# Patient Record
Sex: MALE | Race: BLACK OR AFRICAN AMERICAN
[De-identification: names, ages, dates, MRNs, and addresses within clinical notes are randomized per-mention and may not be internally consistent; named-entity substitution may affect disease eponyms.]

---

## 2019-12-16 ENCOUNTER — HOSPITAL ENCOUNTER (EMERGENCY)
Dept: HOSPITAL 25 - ED | Age: 19
Discharge: HOME | End: 2019-12-16
Payer: COMMERCIAL

## 2019-12-16 VITALS — DIASTOLIC BLOOD PRESSURE: 75 MMHG | SYSTOLIC BLOOD PRESSURE: 124 MMHG

## 2019-12-16 DIAGNOSIS — R00.2: Primary | ICD-10-CM

## 2019-12-16 DIAGNOSIS — R53.83: ICD-10-CM

## 2019-12-16 DIAGNOSIS — R25.1: ICD-10-CM

## 2019-12-16 LAB
ALBUMIN SERPL BCG-MCNC: 4.8 G/DL (ref 3.2–5.2)
ALBUMIN/GLOB SERPL: 1.9 {RATIO} (ref 1–3)
ALP SERPL-CCNC: 61 U/L (ref 34–104)
ALT SERPL W P-5'-P-CCNC: 12 U/L (ref 7–52)
ANION GAP SERPL CALC-SCNC: 10 MMOL/L (ref 2–11)
AST SERPL-CCNC: 15 U/L (ref 13–39)
BASOPHILS # BLD AUTO: 0 10^3/UL (ref 0–0.2)
BUN SERPL-MCNC: 22 MG/DL (ref 6–24)
BUN/CREAT SERPL: 17.9 (ref 8–20)
CALCIUM SERPL-MCNC: 9.7 MG/DL (ref 8.6–10.3)
CHLORIDE SERPL-SCNC: 101 MMOL/L (ref 101–111)
EOSINOPHIL # BLD AUTO: 0 10^3/UL (ref 0–0.6)
GLOBULIN SER CALC-MCNC: 2.5 G/DL (ref 2–4)
GLUCOSE SERPL-MCNC: 96 MG/DL (ref 70–100)
HCO3 SERPL-SCNC: 24 MMOL/L (ref 22–32)
HCT VFR BLD AUTO: 46 % (ref 42–52)
HGB BLD-MCNC: 15.8 G/DL (ref 14–18)
LYMPHOCYTES # BLD AUTO: 1.6 10^3/UL (ref 1–4.8)
MAGNESIUM SERPL-MCNC: 1.9 MG/DL (ref 1.9–2.7)
MCH RBC QN AUTO: 30 PG (ref 27–31)
MCHC RBC AUTO-ENTMCNC: 34 G/DL (ref 31–36)
MCV RBC AUTO: 88 FL (ref 80–94)
MONOCYTES # BLD AUTO: 0.5 10^3/UL (ref 0–0.8)
NEUTROPHILS # BLD AUTO: 6.2 10^3/UL (ref 1.5–7.7)
NRBC # BLD AUTO: 0 10^3/UL
NRBC BLD QL AUTO: 0.1
PLATELET # BLD AUTO: 195 10^3/UL (ref 150–450)
POTASSIUM SERPL-SCNC: 3.6 MMOL/L (ref 3.5–5)
PROT SERPL-MCNC: 7.3 G/DL (ref 6.4–8.9)
RBC # BLD AUTO: 5.22 10^6 /UL (ref 4.18–5.48)
SODIUM SERPL-SCNC: 135 MMOL/L (ref 135–145)
TROPONIN I SERPL-MCNC: 0 NG/ML (ref ?–0.03)
TSH SERPL-ACNC: 1.07 MCIU/ML (ref 0.34–5.6)
WBC # BLD AUTO: 8.3 10^3/UL (ref 3.5–10.8)

## 2019-12-16 PROCEDURE — 99282 EMERGENCY DEPT VISIT SF MDM: CPT

## 2019-12-16 PROCEDURE — 85025 COMPLETE CBC W/AUTO DIFF WBC: CPT

## 2019-12-16 PROCEDURE — 84484 ASSAY OF TROPONIN QUANT: CPT

## 2019-12-16 PROCEDURE — 83735 ASSAY OF MAGNESIUM: CPT

## 2019-12-16 PROCEDURE — 80053 COMPREHEN METABOLIC PANEL: CPT

## 2019-12-16 PROCEDURE — 36415 COLL VENOUS BLD VENIPUNCTURE: CPT

## 2019-12-16 PROCEDURE — 93005 ELECTROCARDIOGRAM TRACING: CPT

## 2019-12-16 PROCEDURE — 84443 ASSAY THYROID STIM HORMONE: CPT

## 2019-12-16 NOTE — ED
Palpitations / Dysrhythmia





- HPI Summary


HPI Summary: 


19 year old M presenting to [facility] complains of intermittent episodes of 

tachycardia and tremors lasting for about 10 minutes and happening every 10 

minutes while trying to fall asleep minutes prior to arrival and intermittent 

palpitations for the last several days. Felt fine before going to bed hours 

ago. Has had mild intermittent weakness in his extremities for the last several 

days. Reports feeling bloated immediately after eating only a little. No light 

headedness, syncope, chest pain. Was seen at [facility] yesterday where he 

had normal EKG done. No recent diet change or stress. The patient rates the 

pain 0/10 in severity. Symptoms aggravated by nothing. Symptoms alleviated by 

nothing.





- History of Current Complaint


Hx Obtained From: Patient


Onset/Duration: Lasting Days, Still Present


Timing: Intermittent Episodes Lasting: - minutes


Severity Currently: None


Aggravating: Nothing


Alleviating: Nothing





- Allergy/Home Medications


Allergies/Adverse Reactions: 


 Allergies











Allergy/AdvReac Type Severity Reaction Status Date / Time


 


No Known Allergies Allergy   Verified 12/16/19 03:40











Home Medications: 


 Home Medications





NK [No Home Medications Reported]  12/16/19 [History Confirmed 12/16/19]











PMH/Surg Hx/FS Hx/Imm Hx


Endocrine/Hematology History: 


   Denies: Hx Diabetes


Cardiovascular History: 


   Denies: Hx Hypertension


Sensory History: Reports: Hx Contacts or Glasses


Opthamlomology History: Reports: Hx Contacts or Glasses





- Surgical History


Surgical History: None


Infectious Disease History: No


Infectious Disease History: 


   Denies: Traveled Outside the [place] in Last 30 Days





- Family History


Known Family History: Positive: Diabetes - maternal side





- Social History


Alcohol Use: None


Hx Substance Use: No


Substance Use Type: Reports: None


Hx Tobacco Use: No


Smoking Status (MU): Never Smoked Tobacco





Review of Systems


Positive: Other - tremors


Negative: Chest Pain


Positive: Other - tachycardia, palpitations


Positive: Other -  feeling bloated immediately after eating only a little


Neurological: Negative - light headedness


Positive: Weakness.  Negative: Syncope


All Other Systems Reviewed And Are Negative: Yes





Physical Exam





- Summary


Physical Exam Summary: 





Appearance: Well-appearing, Well-nourished, lying in bed comfortably


Skin: Warm, dry, no obvious rash


Eyes: sclera anicteric, no conjunctival pallor


ENT: mucous membranes moist, pharynx appears normal


Neck: Supple, nontender


Respiratory: Clear to auscultation, no signs of respiratory distress


Cardiovascular: Normal S1, S2. No murmurs. Normal distal pulses in tibial and 

radial bilaterally.


Abdomen: Soft, nontender, normal active bowel sounds present


Musculoskeletal: Normal, Strength/ROM Intact


Neurological: A&Ox3, awake and alert, mentation is normal, speech is fluent and 

appropriate


Psychiatric: affect is normal, does not appear anxious or depressed


Triage Information Reviewed: Yes


Vital Signs On Initial Exam: 


 Initial Vitals











Temp Pulse Resp BP Pulse Ox


 


 99.0 F   80   16   147/82   100 


 


 12/16/19 03:36  12/16/19 03:36  12/16/19 03:36  12/16/19 03:36  12/16/19 03:36











Vital Signs Reviewed: Yes





Procedures





- Sedation


Patient Received Moderate/Deep Sedation with Procedure: No





Diagnostics





- Vital Signs


 Vital Signs











  Temp Pulse Resp BP Pulse Ox


 


 12/16/19 03:36  99.0 F  80  16  147/82  100














- Laboratory


Result Diagrams: 


 12/16/19 04:07





 12/16/19 04:07


Lab Statement: Any lab studies that have been ordered have been reviewed, and 

results considered in the medical decision making process.





- EKG


  ** [id number]


Cardiac Rate: NL - 65 BPM


EKG Rhythm: Sinus Rhythm


Summary of EKG Findings: NSR at 65 BPM, P waves, QRS complex, and T waves are 

within normal limits, T waves and intervals are normal, no ischemic changes. 

This is a normal EKG.





Re-Evaluation





- Re-Evaluation


  ** First Eval


Re-Evaluation Time: 05:14


Comment: informed of lab findings.  agrees to d/c





Course/Dx





- Course


Course Of Treatment: 19 year old M arriving via private car complains of 

intermittent episodes of tachycardia and tremors lasting for about 10 minutes 

and happening every 10 minutes while trying to fall asleep minutes prior to 

arrival and intermittent palpitations for the last several days. Was seen at 

[facility] yesterday where he had normal EKG done. No light headedness, 

syncope, chest pain.  Physical exam unremarkable.  Bloodwork results with no 

significant abnormalities except for creatinine 1.23.  An EKG shows NSR at 65 

BPM, P waves, QRS complex, and T waves are within normal limits, T waves and 

intervals are normal, no ischemic changes. This is a normal EKG.  Patient will 

be discharged home with follow up from [facility]. Patient was instructed 

to return to Emergency Department for new or worsening symptoms. Patient 

understands and is agreeable to this plan.





- Diagnoses


Provider Diagnoses: 


 Palpitations with regular cardiac rhythm








Discharge ED





- Sign-Out/Discharge


Documenting (check all that apply): Patient Departure





- Discharge Plan


Condition: Good


Disposition: HOME


Patient Education Materials:  Heart Palpitations (ED)


Referrals: 


[facility] [Outside]


Additional Instructions: 


I don't think these episodes are a sign of anything serious. As we discussed, 

many different things in the environment, in your diet, and in your lifestyle 

can cause them. It might be helpful to get a fitbit or other wearable to that 

can check your heart rate. If it becomes very elevated (above 140) without an 

obvious reason and persists, we should see you back.





- Billing Disposition and Condition


Condition: GOOD


Disposition: Home





- Attestation Statements


Document Initiated by Em: Yes


Documenting Scribe: Miryam Abel


Provider For Whom Joane is Documenting (Include Credential): Weston Wing MD


Scribe Attestation: 


IMiryam, scribed for Weston Wing MD on 12/16/19 at 0547. 


Scribe Documentation Reviewed: Yes


Provider Attestation: 


The documentation as recorded by the Miryam bassett accurately reflects 

the service I personally performed and the decisions made by me, Weston Wing MD


Status of Scribe Document: Viewed